# Patient Record
Sex: MALE | Race: WHITE | Employment: STUDENT
[De-identification: names, ages, dates, MRNs, and addresses within clinical notes are randomized per-mention and may not be internally consistent; named-entity substitution may affect disease eponyms.]

---
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
## 2023-10-11 ENCOUNTER — OFFICE VISIT (OUTPATIENT)
Dept: INTERNAL MEDICINE CLINIC | Facility: CLINIC | Age: 18
End: 2023-10-11
Payer: COMMERCIAL

## 2023-10-11 VITALS
OXYGEN SATURATION: 99 % | WEIGHT: 218 LBS | HEART RATE: 50 BPM | TEMPERATURE: 97.9 F | BODY MASS INDEX: 30.52 KG/M2 | SYSTOLIC BLOOD PRESSURE: 116 MMHG | HEIGHT: 71 IN | DIASTOLIC BLOOD PRESSURE: 44 MMHG

## 2023-10-11 DIAGNOSIS — Z76.89 ESTABLISHING CARE WITH NEW DOCTOR, ENCOUNTER FOR: Primary | ICD-10-CM

## 2023-10-11 DIAGNOSIS — R00.1 BRADYCARDIA: ICD-10-CM

## 2023-10-11 DIAGNOSIS — F42.2 MIXED OBSESSIONAL THOUGHTS AND ACTS: ICD-10-CM

## 2023-10-11 PROCEDURE — 93000 ELECTROCARDIOGRAM COMPLETE: CPT | Performed by: INTERNAL MEDICINE

## 2023-10-11 PROCEDURE — 99214 OFFICE O/P EST MOD 30 MIN: CPT | Performed by: INTERNAL MEDICINE

## 2023-10-11 ASSESSMENT — PATIENT HEALTH QUESTIONNAIRE - PHQ9
SUM OF ALL RESPONSES TO PHQ QUESTIONS 1-9: 0
2. FEELING DOWN, DEPRESSED OR HOPELESS: 0
SUM OF ALL RESPONSES TO PHQ QUESTIONS 1-9: 0
1. LITTLE INTEREST OR PLEASURE IN DOING THINGS: 0
SUM OF ALL RESPONSES TO PHQ QUESTIONS 1-9: 0
SUM OF ALL RESPONSES TO PHQ9 QUESTIONS 1 & 2: 0
SUM OF ALL RESPONSES TO PHQ QUESTIONS 1-9: 0

## 2023-10-11 ASSESSMENT — ANXIETY QUESTIONNAIRES
GAD7 TOTAL SCORE: 13
2. NOT BEING ABLE TO STOP OR CONTROL WORRYING: 2
4. TROUBLE RELAXING: 2
6. BECOMING EASILY ANNOYED OR IRRITABLE: 0
3. WORRYING TOO MUCH ABOUT DIFFERENT THINGS: 2
5. BEING SO RESTLESS THAT IT IS HARD TO SIT STILL: 2
1. FEELING NERVOUS, ANXIOUS, OR ON EDGE: 2
IF YOU CHECKED OFF ANY PROBLEMS ON THIS QUESTIONNAIRE, HOW DIFFICULT HAVE THESE PROBLEMS MADE IT FOR YOU TO DO YOUR WORK, TAKE CARE OF THINGS AT HOME, OR GET ALONG WITH OTHER PEOPLE: VERY DIFFICULT
7. FEELING AFRAID AS IF SOMETHING AWFUL MIGHT HAPPEN: 3

## 2024-05-01 ENCOUNTER — TELEPHONE (OUTPATIENT)
Dept: INTERNAL MEDICINE CLINIC | Facility: CLINIC | Age: 19
End: 2024-05-01

## 2024-05-01 NOTE — TELEPHONE ENCOUNTER
CALLED AND LVM INFORMING PT THAT HE MISSED AN APPT. INFORMED PT THAT A NS LETTER WILL BE SENT OUT. TY

## 2024-06-25 NOTE — PROGRESS NOTES
Length of meeting;  50 minutes    Start Time: 1604    Stop Time: 1654    Diagnosis: Generalized anxiety disorder.  Mixed obsessional thoughts and acts.        Treatment Plan: This is pended to next session as we will plan to review treatment goals at that time; the patient is given an assignment related to goal setting in session today.    Patient Prognosis: Guarded at present time.    Patient Progress: This is an initial visit for this patient who is referred by Gus Haque MD for mixed obsessional thoughts and acts.  He was a no show for his prior initial appt with me on 12/18/23.  At time of referral, he was having compulsive teeth brushing; he had had a GF breakup, now about a year ago. The medical record is reviewed prior to this visit today.    He notes that \"I have to do things a certain amount of time, then something bad will happen.\"  He notes that \"if I don't do this thing, then my GF will cheat on me.\"  He notes that his issues with anxiety began when he moved here from [place] in  from private school and went to a large public school here.  The GF that he had in the recent past did cheat on him; linked to the above.  However, he is noted to have engaged in some ritualistic behavior in .      Mental Status exam: PHQ-2 is a 0.  No issues with sleep or appetite.   MAGALYS-7 is done with a score of 14.  Both are shared with the patient.  The patient indicates his alcohol use is none, and that his drug use is none.  No current active SI or HI.  The patient agrees today that if thoughts of self-harm or harm of others begins to occur that they will call 911, or go to the nearest ER or Urgent Care Center, prior to acting on these thoughts.  Thought processes appear normal.  Speech is goal directed.  The patient does not appear to be responding to internal stimuli.      Plan: Pt indicates that he will have to think about seeing me again, first. He will call to reschedule should he choose to do so.  Marvin Mooney

## 2024-07-10 ENCOUNTER — OFFICE VISIT (OUTPATIENT)
Dept: BEHAVIORAL/MENTAL HEALTH CLINIC | Age: 19
End: 2024-07-10
Payer: COMMERCIAL

## 2024-07-10 DIAGNOSIS — F42.2 MIXED OBSESSIONAL THOUGHTS AND ACTS: ICD-10-CM

## 2024-07-10 DIAGNOSIS — F41.1 GENERALIZED ANXIETY DISORDER: Primary | ICD-10-CM

## 2024-07-10 PROCEDURE — 90791 PSYCH DIAGNOSTIC EVALUATION: CPT | Performed by: SOCIAL WORKER

## 2024-07-10 ASSESSMENT — PATIENT HEALTH QUESTIONNAIRE - PHQ9
SUM OF ALL RESPONSES TO PHQ QUESTIONS 1-9: 0
SUM OF ALL RESPONSES TO PHQ QUESTIONS 1-9: 0
1. LITTLE INTEREST OR PLEASURE IN DOING THINGS: NOT AT ALL
SUM OF ALL RESPONSES TO PHQ QUESTIONS 1-9: 0
SUM OF ALL RESPONSES TO PHQ9 QUESTIONS 1 & 2: 0
SUM OF ALL RESPONSES TO PHQ QUESTIONS 1-9: 0
2. FEELING DOWN, DEPRESSED OR HOPELESS: NOT AT ALL

## 2024-07-10 ASSESSMENT — ANXIETY QUESTIONNAIRES
4. TROUBLE RELAXING: SEVERAL DAYS
GAD7 TOTAL SCORE: 14
7. FEELING AFRAID AS IF SOMETHING AWFUL MIGHT HAPPEN: NEARLY EVERY DAY
2. NOT BEING ABLE TO STOP OR CONTROL WORRYING: NEARLY EVERY DAY
1. FEELING NERVOUS, ANXIOUS, OR ON EDGE: NEARLY EVERY DAY
5. BEING SO RESTLESS THAT IT IS HARD TO SIT STILL: NOT AT ALL
6. BECOMING EASILY ANNOYED OR IRRITABLE: SEVERAL DAYS
3. WORRYING TOO MUCH ABOUT DIFFERENT THINGS: NEARLY EVERY DAY
IF YOU CHECKED OFF ANY PROBLEMS ON THIS QUESTIONNAIRE, HOW DIFFICULT HAVE THESE PROBLEMS MADE IT FOR YOU TO DO YOUR WORK, TAKE CARE OF THINGS AT HOME, OR GET ALONG WITH OTHER PEOPLE: SOMEWHAT DIFFICULT